# Patient Record
Sex: MALE | Race: WHITE | NOT HISPANIC OR LATINO | URBAN - METROPOLITAN AREA
[De-identification: names, ages, dates, MRNs, and addresses within clinical notes are randomized per-mention and may not be internally consistent; named-entity substitution may affect disease eponyms.]

---

## 2022-01-01 ENCOUNTER — TELEPHONE (OUTPATIENT)
Dept: TRANSPLANT | Facility: CLINIC | Age: 57
End: 2022-01-01
Payer: COMMERCIAL

## 2022-01-01 ENCOUNTER — TELEPHONE (OUTPATIENT)
Dept: TRANSPLANT | Facility: CLINIC | Age: 57
End: 2022-01-01

## 2022-01-01 ENCOUNTER — LAB (OUTPATIENT)
Dept: LAB | Facility: CLINIC | Age: 57
End: 2022-01-01
Attending: INTERNAL MEDICINE
Payer: COMMERCIAL

## 2022-01-01 ENCOUNTER — DOCUMENTATION ONLY (OUTPATIENT)
Dept: TRANSPLANT | Facility: CLINIC | Age: 57
End: 2022-01-01
Payer: COMMERCIAL

## 2022-01-01 ENCOUNTER — PRE VISIT (OUTPATIENT)
Dept: GASTROENTEROLOGY | Facility: CLINIC | Age: 57
End: 2022-01-01
Payer: COMMERCIAL

## 2022-01-01 ENCOUNTER — HOSPITAL ENCOUNTER (OUTPATIENT)
Dept: BEHAVIORAL HEALTH | Facility: CLINIC | Age: 57
Discharge: HOME | End: 2022-03-15
Attending: INTERNAL MEDICINE
Payer: COMMERCIAL

## 2022-01-01 ENCOUNTER — TRANSCRIBE ORDERS (OUTPATIENT)
Dept: OTHER | Age: 57
End: 2022-01-01
Payer: COMMERCIAL

## 2022-01-01 ENCOUNTER — TELEPHONE (OUTPATIENT)
Dept: GASTROENTEROLOGY | Facility: CLINIC | Age: 57
End: 2022-01-01

## 2022-01-01 ENCOUNTER — ALLIED HEALTH/NURSE VISIT (OUTPATIENT)
Dept: TRANSPLANT | Facility: CLINIC | Age: 57
End: 2022-01-01
Attending: INTERNAL MEDICINE
Payer: COMMERCIAL

## 2022-01-01 ENCOUNTER — DOCUMENTATION ONLY (OUTPATIENT)
Dept: OTHER | Facility: CLINIC | Age: 57
End: 2022-01-01
Payer: COMMERCIAL

## 2022-01-01 ENCOUNTER — REFERRAL (OUTPATIENT)
Dept: TRANSPLANT | Facility: CLINIC | Age: 57
End: 2022-01-01
Payer: COMMERCIAL

## 2022-01-01 ENCOUNTER — COMMITTEE REVIEW (OUTPATIENT)
Dept: TRANSPLANT | Facility: CLINIC | Age: 57
End: 2022-01-01
Payer: COMMERCIAL

## 2022-01-01 ENCOUNTER — TRANSFERRED RECORDS (OUTPATIENT)
Dept: HEALTH INFORMATION MANAGEMENT | Facility: CLINIC | Age: 57
End: 2022-01-01
Payer: COMMERCIAL

## 2022-01-01 ENCOUNTER — MEDICAL CORRESPONDENCE (OUTPATIENT)
Dept: HEALTH INFORMATION MANAGEMENT | Facility: CLINIC | Age: 57
End: 2022-01-01
Payer: COMMERCIAL

## 2022-01-01 ENCOUNTER — ANCILLARY PROCEDURE (OUTPATIENT)
Dept: ULTRASOUND IMAGING | Facility: CLINIC | Age: 57
End: 2022-01-01
Attending: INTERNAL MEDICINE
Payer: COMMERCIAL

## 2022-01-01 ENCOUNTER — OFFICE VISIT (OUTPATIENT)
Dept: GASTROENTEROLOGY | Facility: CLINIC | Age: 57
End: 2022-01-01
Payer: COMMERCIAL

## 2022-01-01 ENCOUNTER — ANCILLARY PROCEDURE (OUTPATIENT)
Dept: GENERAL RADIOLOGY | Facility: CLINIC | Age: 57
End: 2022-01-01
Attending: INTERNAL MEDICINE
Payer: COMMERCIAL

## 2022-01-01 VITALS
BODY MASS INDEX: 26.92 KG/M2 | DIASTOLIC BLOOD PRESSURE: 58 MMHG | HEIGHT: 68 IN | WEIGHT: 177.6 LBS | SYSTOLIC BLOOD PRESSURE: 92 MMHG | OXYGEN SATURATION: 100 % | HEART RATE: 63 BPM

## 2022-01-01 VITALS
DIASTOLIC BLOOD PRESSURE: 67 MMHG | WEIGHT: 199.5 LBS | OXYGEN SATURATION: 100 % | BODY MASS INDEX: 30.33 KG/M2 | HEART RATE: 93 BPM | SYSTOLIC BLOOD PRESSURE: 103 MMHG

## 2022-01-01 VITALS — HEIGHT: 68 IN | BODY MASS INDEX: 26.98 KG/M2 | WEIGHT: 178 LBS

## 2022-01-01 DIAGNOSIS — K70.31 ALCOHOLIC CIRRHOSIS OF LIVER WITH ASCITES (H): ICD-10-CM

## 2022-01-01 DIAGNOSIS — K70.30 ALCOHOLIC CIRRHOSIS (H): ICD-10-CM

## 2022-01-01 DIAGNOSIS — K76.6 PORTAL HYPERTENSION (H): ICD-10-CM

## 2022-01-01 DIAGNOSIS — G93.40 ENCEPHALOPATHY: ICD-10-CM

## 2022-01-01 DIAGNOSIS — K76.9 LIVER LESION: ICD-10-CM

## 2022-01-01 DIAGNOSIS — D64.9 ANEMIA: ICD-10-CM

## 2022-01-01 DIAGNOSIS — K70.30 ALCOHOLIC CIRRHOSIS (H): Primary | ICD-10-CM

## 2022-01-01 DIAGNOSIS — K70.31 ALCOHOLIC CIRRHOSIS OF LIVER WITH ASCITES (H): Primary | ICD-10-CM

## 2022-01-01 DIAGNOSIS — D64.9 ANEMIA, UNSPECIFIED TYPE: ICD-10-CM

## 2022-01-01 DIAGNOSIS — F41.9 ANXIETY: Primary | ICD-10-CM

## 2022-01-01 DIAGNOSIS — E44.0 MODERATE PROTEIN-CALORIE MALNUTRITION (H): ICD-10-CM

## 2022-01-01 DIAGNOSIS — I85.11 SECONDARY ESOPHAGEAL VARICES WITH BLEEDING (H): ICD-10-CM

## 2022-01-01 DIAGNOSIS — K76.82 HEPATIC ENCEPHALOPATHY (H): ICD-10-CM

## 2022-01-01 LAB
A1 AB TITR SERPL: >256 {TITER}
A1 AB TITR SERPL: >256 {TITER}
ABO/RH(D): NORMAL
ABO/RH(D): NORMAL
AFP SERPL-MCNC: 2.1 UG/L (ref 0–8)
AFP SERPL-MCNC: <1.5 UG/L (ref 0–8)
ALBUMIN SERPL-MCNC: 2.2 G/DL (ref 3.4–5)
ALBUMIN SERPL-MCNC: 3 G/DL (ref 3.4–5)
ALP SERPL-CCNC: 148 U/L (ref 40–150)
ALP SERPL-CCNC: 205 U/L (ref 40–150)
ALT SERPL W P-5'-P-CCNC: 24 U/L (ref 0–70)
ALT SERPL W P-5'-P-CCNC: 26 U/L (ref 0–70)
ANION GAP SERPL CALCULATED.3IONS-SCNC: 10 MMOL/L (ref 3–14)
ANION GAP SERPL CALCULATED.3IONS-SCNC: 12 MMOL/L (ref 3–14)
ANTIBODY SCREEN: NEGATIVE
AST SERPL W P-5'-P-CCNC: 26 U/L (ref 0–45)
AST SERPL W P-5'-P-CCNC: 38 U/L (ref 0–45)
B IGG TITR SERPL: >256 {TITER}
BILIRUB DIRECT SERPL-MCNC: 1.2 MG/DL (ref 0–0.2)
BILIRUB DIRECT SERPL-MCNC: 1.3 MG/DL (ref 0–0.2)
BILIRUB SERPL-MCNC: 2 MG/DL (ref 0.2–1.3)
BILIRUB SERPL-MCNC: 3.2 MG/DL (ref 0.2–1.3)
BUN SERPL-MCNC: 37 MG/DL (ref 7–30)
BUN SERPL-MCNC: 38 MG/DL (ref 7–30)
CALCIUM SERPL-MCNC: 8 MG/DL (ref 8.5–10.1)
CALCIUM SERPL-MCNC: 8.3 MG/DL (ref 8.5–10.1)
CHLORIDE BLD-SCNC: 104 MMOL/L (ref 94–109)
CHLORIDE BLD-SCNC: 108 MMOL/L (ref 94–109)
CMV IGG SERPL IA-ACNC: <0.2 U/ML
CMV IGG SERPL IA-ACNC: NORMAL
CO2 SERPL-SCNC: 18 MMOL/L (ref 20–32)
CO2 SERPL-SCNC: 21 MMOL/L (ref 20–32)
CREAT SERPL-MCNC: 1.52 MG/DL (ref 0.66–1.25)
CREAT SERPL-MCNC: 1.81 MG/DL (ref 0.66–1.25)
DEPRECATED CALCIDIOL+CALCIFEROL SERPL-MC: 21 UG/L (ref 20–75)
DLCOUNC-%PRED-PRE: 85 %
DLCOUNC-PRE: 22.53 ML/MIN/MMHG
DLCOUNC-PRED: 26.29 ML/MIN/MMHG
EBV VCA IGG SER IA-ACNC: >750 U/ML
EBV VCA IGG SER IA-ACNC: POSITIVE
ERV-%PRED-PRE: 43 %
ERV-PRE: 0.41 L
ERV-PRED: 0.94 L
ERYTHROCYTE [DISTWIDTH] IN BLOOD BY AUTOMATED COUNT: 18.2 % (ref 10–15)
ERYTHROCYTE [DISTWIDTH] IN BLOOD BY AUTOMATED COUNT: 18.6 % (ref 10–15)
EXPTIME-PRE: 6.9 SEC
FEF2575-%PRED-PRE: 104 %
FEF2575-PRE: 3.09 L/SEC
FEF2575-PRED: 2.96 L/SEC
FEFMAX-%PRED-PRE: 76 %
FEFMAX-PRE: 6.85 L/SEC
FEFMAX-PRED: 8.92 L/SEC
FERRITIN SERPL-MCNC: 53 NG/ML (ref 26–388)
FEV1-%PRED-PRE: 94 %
FEV1-PRE: 3.25 L
FEV1FEV6-PRE: 81 %
FEV1FEV6-PRED: 79 %
FEV1FVC-PRE: 81 %
FEV1FVC-PRED: 78 %
FEV1SVC-PRE: 96 %
FEV1SVC-PRED: 73 %
FIFMAX-PRE: 4.17 L/SEC
FVC-%PRED-PRE: 91 %
FVC-PRE: 4.04 L
FVC-PRED: 4.4 L
GAMMA INTERFERON BACKGROUND BLD IA-ACNC: 0.05 IU/ML
GFR SERPL CREATININE-BSD FRML MDRD: 43 ML/MIN/1.73M2
GFR SERPL CREATININE-BSD FRML MDRD: 53 ML/MIN/1.73M2
GLUCOSE BLD-MCNC: 138 MG/DL (ref 70–99)
GLUCOSE BLD-MCNC: 144 MG/DL (ref 70–99)
HAV IGG SER QL IA: REACTIVE
HBV CORE AB SERPL QL IA: NONREACTIVE
HBV SURFACE AB SERPL IA-ACNC: 70.99 M[IU]/ML
HCT VFR BLD AUTO: 23 % (ref 40–53)
HCT VFR BLD AUTO: 25.6 % (ref 40–53)
HGB BLD-MCNC: 6.9 G/DL (ref 13.3–17.7)
HGB BLD-MCNC: 7.8 G/DL (ref 13.3–17.7)
HIV 1+2 AB+HIV1 P24 AG SERPL QL IA: NONREACTIVE
IC-%PRED-PRE: 79 %
IC-PRE: 2.98 L
IC-PRED: 3.75 L
INR PPP: 1.58 (ref 0.85–1.15)
INR PPP: 2.24 (ref 0.85–1.15)
IRON SATN MFR SERPL: 30 % (ref 15–46)
IRON SERPL-MCNC: 69 UG/DL (ref 35–180)
M TB IFN-G BLD-IMP: NEGATIVE
M TB IFN-G CD4+ BCKGRND COR BLD-ACNC: 4.61 IU/ML
MCH RBC QN AUTO: 27.4 PG (ref 26.5–33)
MCH RBC QN AUTO: 27.9 PG (ref 26.5–33)
MCHC RBC AUTO-ENTMCNC: 30 G/DL (ref 31.5–36.5)
MCHC RBC AUTO-ENTMCNC: 30.5 G/DL (ref 31.5–36.5)
MCV RBC AUTO: 91 FL (ref 78–100)
MCV RBC AUTO: 91 FL (ref 78–100)
MITOGEN IGNF BCKGRD COR BLD-ACNC: 0.01 IU/ML
MITOGEN IGNF BCKGRD COR BLD-ACNC: 0.01 IU/ML
PHOSPHATE SERPL-MCNC: 3 MG/DL (ref 2.5–4.5)
PLATELET # BLD AUTO: 107 10E3/UL (ref 150–450)
PLATELET # BLD AUTO: 140 10E3/UL (ref 150–450)
PLPETH BLD-MCNC: <10 NG/ML
POPETH BLD-MCNC: <10 NG/ML
POTASSIUM BLD-SCNC: 3.4 MMOL/L (ref 3.4–5.3)
POTASSIUM BLD-SCNC: 4.5 MMOL/L (ref 3.4–5.3)
PROT SERPL-MCNC: 5.8 G/DL (ref 6.8–8.8)
PROT SERPL-MCNC: 5.9 G/DL (ref 6.8–8.8)
PSA SERPL-MCNC: 0.88 UG/L (ref 0–4)
QUANTIFERON MITOGEN: 4.66 IU/ML
QUANTIFERON NIL TUBE: 0.05 IU/ML
QUANTIFERON TB1 TUBE: 0.06 IU/ML
QUANTIFERON TB2 TUBE: 0.06
RBC # BLD AUTO: 2.52 10E6/UL (ref 4.4–5.9)
RBC # BLD AUTO: 2.8 10E6/UL (ref 4.4–5.9)
SODIUM SERPL-SCNC: 136 MMOL/L (ref 133–144)
SODIUM SERPL-SCNC: 137 MMOL/L (ref 133–144)
SPECIMEN EXPIRATION DATE: NORMAL
T PALLIDUM AB SER QL: NONREACTIVE
T4 FREE SERPL-MCNC: 1.06 NG/DL (ref 0.76–1.46)
TIBC SERPL-MCNC: 232 UG/DL (ref 240–430)
TSH SERPL DL<=0.005 MIU/L-ACNC: 4.86 MU/L (ref 0.4–4)
VA-%PRED-PRE: 104 %
VA-PRE: 6.39 L
VC-%PRED-PRE: 72 %
VC-PRE: 3.38 L
VC-PRED: 4.69 L
WBC # BLD AUTO: 11.4 10E3/UL (ref 4–11)
WBC # BLD AUTO: 8.3 10E3/UL (ref 4–11)

## 2022-01-01 PROCEDURE — 80053 COMPREHEN METABOLIC PANEL: CPT | Performed by: PATHOLOGY

## 2022-01-01 PROCEDURE — 85027 COMPLETE CBC AUTOMATED: CPT | Performed by: PATHOLOGY

## 2022-01-01 PROCEDURE — 86901 BLOOD TYPING SEROLOGIC RH(D): CPT | Performed by: INTERNAL MEDICINE

## 2022-01-01 PROCEDURE — G0463 HOSPITAL OUTPT CLINIC VISIT: HCPCS

## 2022-01-01 PROCEDURE — 82248 BILIRUBIN DIRECT: CPT | Performed by: PATHOLOGY

## 2022-01-01 PROCEDURE — 84100 ASSAY OF PHOSPHORUS: CPT | Performed by: PATHOLOGY

## 2022-01-01 PROCEDURE — 82105 ALPHA-FETOPROTEIN SERUM: CPT | Performed by: INTERNAL MEDICINE

## 2022-01-01 PROCEDURE — 86780 TREPONEMA PALLIDUM: CPT | Performed by: INTERNAL MEDICINE

## 2022-01-01 PROCEDURE — 86704 HEP B CORE ANTIBODY TOTAL: CPT | Performed by: INTERNAL MEDICINE

## 2022-01-01 PROCEDURE — 84439 ASSAY OF FREE THYROXINE: CPT | Performed by: PATHOLOGY

## 2022-01-01 PROCEDURE — 84443 ASSAY THYROID STIM HORMONE: CPT | Performed by: PATHOLOGY

## 2022-01-01 PROCEDURE — 86850 RBC ANTIBODY SCREEN: CPT | Performed by: INTERNAL MEDICINE

## 2022-01-01 PROCEDURE — 85610 PROTHROMBIN TIME: CPT | Performed by: PATHOLOGY

## 2022-01-01 PROCEDURE — 99207 PHOSPHATIDYLETHANOL (PETH), WHOLE BLOOD: CPT | Performed by: PATHOLOGY

## 2022-01-01 PROCEDURE — 99205 OFFICE O/P NEW HI 60 MIN: CPT | Performed by: TRANSPLANT SURGERY

## 2022-01-01 PROCEDURE — 99205 OFFICE O/P NEW HI 60 MIN: CPT | Performed by: INTERNAL MEDICINE

## 2022-01-01 PROCEDURE — 86665 EPSTEIN-BARR CAPSID VCA: CPT | Performed by: INTERNAL MEDICINE

## 2022-01-01 PROCEDURE — 86706 HEP B SURFACE ANTIBODY: CPT | Performed by: INTERNAL MEDICINE

## 2022-01-01 PROCEDURE — 71046 X-RAY EXAM CHEST 2 VIEWS: CPT | Mod: GC | Performed by: RADIOLOGY

## 2022-01-01 PROCEDURE — 94375 RESPIRATORY FLOW VOLUME LOOP: CPT | Performed by: INTERNAL MEDICINE

## 2022-01-01 PROCEDURE — 93975 VASCULAR STUDY: CPT | Mod: GC | Performed by: STUDENT IN AN ORGANIZED HEALTH CARE EDUCATION/TRAINING PROGRAM

## 2022-01-01 PROCEDURE — 82728 ASSAY OF FERRITIN: CPT | Performed by: PATHOLOGY

## 2022-01-01 PROCEDURE — 86886 COOMBS TEST INDIRECT TITER: CPT | Performed by: INTERNAL MEDICINE

## 2022-01-01 PROCEDURE — 99000 SPECIMEN HANDLING OFFICE-LAB: CPT | Performed by: PATHOLOGY

## 2022-01-01 PROCEDURE — 94729 DIFFUSING CAPACITY: CPT | Performed by: INTERNAL MEDICINE

## 2022-01-01 PROCEDURE — G0103 PSA SCREENING: HCPCS | Performed by: PATHOLOGY

## 2022-01-01 PROCEDURE — 86644 CMV ANTIBODY: CPT | Performed by: INTERNAL MEDICINE

## 2022-01-01 PROCEDURE — 83550 IRON BINDING TEST: CPT | Performed by: PATHOLOGY

## 2022-01-01 PROCEDURE — 36415 COLL VENOUS BLD VENIPUNCTURE: CPT | Performed by: PATHOLOGY

## 2022-01-01 PROCEDURE — 82306 VITAMIN D 25 HYDROXY: CPT | Performed by: INTERNAL MEDICINE

## 2022-01-01 PROCEDURE — 86708 HEPATITIS A ANTIBODY: CPT | Performed by: INTERNAL MEDICINE

## 2022-01-01 PROCEDURE — 86481 TB AG RESPONSE T-CELL SUSP: CPT | Performed by: INTERNAL MEDICINE

## 2022-01-01 RX ORDER — FUROSEMIDE 20 MG
TABLET ORAL
COMMUNITY
Start: 2021-01-01

## 2022-01-01 RX ORDER — NYSTATIN 100000 [USP'U]/G
POWDER TOPICAL
COMMUNITY
Start: 2021-01-01

## 2022-01-01 RX ORDER — SUCRALFATE 1 G/1
TABLET ORAL
COMMUNITY
Start: 2021-01-01

## 2022-01-01 RX ORDER — FERROUS SULFATE 325(65) MG
325 TABLET ORAL
COMMUNITY
Start: 2021-01-01

## 2022-01-01 RX ORDER — LACTULOSE 10 G/15ML
20 SOLUTION ORAL
COMMUNITY
Start: 2021-01-01

## 2022-01-01 RX ORDER — PROCHLORPERAZINE MALEATE 5 MG
5 TABLET ORAL
COMMUNITY

## 2022-01-01 RX ORDER — LANOLIN ALCOHOL/MO/W.PET/CERES
100 CREAM (GRAM) TOPICAL
COMMUNITY

## 2022-01-01 RX ORDER — DIAZEPAM 5 MG
5 TABLET ORAL ONCE
Status: DISCONTINUED | OUTPATIENT
Start: 2022-01-01 | End: 2022-01-01 | Stop reason: CLARIF

## 2022-01-01 RX ORDER — DIAZEPAM 5 MG
5 TABLET ORAL EVERY 6 HOURS PRN
Qty: 1 TABLET | Refills: 0 | Status: SHIPPED | OUTPATIENT
Start: 2022-01-01

## 2022-01-01 RX ORDER — PANTOPRAZOLE SODIUM 40 MG/1
40 TABLET, DELAYED RELEASE ORAL
COMMUNITY
Start: 2021-01-01

## 2022-01-01 ASSESSMENT — PAIN SCALES - GENERAL: PAINLEVEL: NO PAIN (0)

## 2022-01-01 ASSESSMENT — MIFFLIN-ST. JEOR: SCORE: 1605.09

## 2022-01-31 NOTE — TELEPHONE ENCOUNTER
RECORDS RECEIVED FROM: external  Diagnoses: alcoholic cirrhosis     Appt Date: 2.8.22   NOTES STATUS DETAILS   OFFICE NOTE from referring provider Received 1.17.22 Dr. Miki Washington, [facility]   OFFICE NOTES from other specialists N/A    DISCHARGE SUMMARY from hospital Received Discharged 1.20.22 [facility]   MEDICATION LIST N/A    LIVER BIOSPY (IF APPLICABLE)      PATHOLOGY REPORTS  N/A    IMAGING     ENDOSCOPY (IF AVAILABLE) N/A    COLONOSCOPY (IF AVAILABLE) N/A    ULTRASOUND LIVER N/A    CT OF ABDOMEN N/A    MRI OF LIVER N/A    FIBROSCAN, US ELASTOGRAPHY, FIBROSIS SCAN, MR ELASTOGRAPHY N/A    LABS     HEPATIC PANEL (LIVER PANEL) N/A    BASIC METABOLIC PANEL N/A    COMPLETE METABOLIC PANEL N/A    COMPLETE BLOOD COUNT (CBC) Care Everywhere 1.20.22   INTERNATIONAL NORMALIZED RATIO (INR) N/A    HEPATITIS C ANTIBODY N/A    HEPATITIS C VIRAL LOAD/PCR N/A    HEPATITIS C GENOTYPE N/A    HEPATITIS B SURFACE ANTIGEN N/A    HEPATITIS B SURFACE ANTIBODY N/A    HEPATITIS B DNA QUANT LEVEL N/A    HEPATITIS B CORE ANTIBODY N/A      Action 1.31.22 MJ   Action Taken Sent request to [facility] for any other notes related.

## 2022-02-08 PROBLEM — K70.31 ALCOHOLIC CIRRHOSIS OF LIVER WITH ASCITES (H): Status: ACTIVE | Noted: 2022-01-01

## 2022-02-08 PROBLEM — I85.11 SECONDARY ESOPHAGEAL VARICES WITH BLEEDING (H): Status: ACTIVE | Noted: 2022-01-01

## 2022-02-08 PROBLEM — E44.0 MODERATE PROTEIN-CALORIE MALNUTRITION (H): Status: ACTIVE | Noted: 2022-01-01

## 2022-02-08 PROBLEM — K76.82 HEPATIC ENCEPHALOPATHY (H): Status: ACTIVE | Noted: 2022-01-01

## 2022-02-08 NOTE — PROGRESS NOTES
Date of Service: 2/8/2022     Referring Provider: Miki Washington    Subjective:            Trey Oneill is a 56 year old male presenting for evaluation of liver disease    History of Present Illness   Trey Oneill is a 56 year old male with past medical history of severe alcohol use disorder in sustained remission complicated with the development of alcohol-related cirrhosis complicated by bleeding esophageal varices, anemia, ascites who presents to [facility] care.    Notes he was diagnosed with cirrhosis back in 2018 when he presented to the hospital with an acute upper GI bleed and hematemesis.  Endoscopy at that time demonstrated large varices and he underwent band ligation. At that time he was drinking approximately 12 beers per day, and had done so for many years prior.  He embraced sobriety and has not had any alcohol since.  Decompensation with the recent onset of encephalopathy for which he has been prescribed lactulose ongoing varices noted on endoscopy that have been slow to respond to band ligation, and ascites which has been somewhat refractory to diuretics (main side effect of dizziness and hypotension) and is requiring a paracentesis every 1 to 2 weeks.     He had previously been on beta-blocker, but this was discontinued during his recent hospitalization for hypotension.    He underwent drainage of a hydrocele in late January 2022    Denies any overt medical history prior to the onset of his cirrhosis.  Denies significant surgical history.  He was a smoker but quit for 5 years ago.  Denies a history of IV or inhaled drugs.  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    Past Medical History:  Past Medical History:   Diagnosis Date     Alcohol use disorder, severe, in sustained remission (H)      Alcoholic cirrhosis of liver with ascites (H)      Hepatic encephalopathy (H)      Protein-calorie malnutrition, moderate (H)   "      Surgical History:  History reviewed. No pertinent surgical history.    Social History:  Social History     Tobacco Use     Smoking status: Former Smoker     Types: Cigarettes     Smokeless tobacco: Never Used   Substance Use Topics     Alcohol use: Not Currently     Comment: 5/18/2018; ~12 beers per day     Drug use: Never       Family History:  Family History   Problem Relation Age of Onset     Substance Abuse Father      Substance Abuse Cousin      Liver Disease Cousin        Medications:  Current Outpatient Medications   Medication     ferrous sulfate (FEROSUL) 325 (65 Fe) MG tablet     lactulose (CHRONULAC) 10 GM/15ML solution     NYSTOP 043464 UNIT/GM powder     pantoprazole (PROTONIX) 40 MG EC tablet     prochlorperazine (COMPAZINE) 5 MG tablet     sucralfate (CARAFATE) 1 GM tablet     thiamine (B-1) 100 MG tablet     No current facility-administered medications for this visit.       Review of Systems  A complete 10 point review of systems was asked and answered in the negative unless specifically commented upon in the HPI    Objective:         Vitals:    02/08/22 0807   BP: 92/58   Pulse: 63   SpO2: 100%   Weight: 80.6 kg (177 lb 9.6 oz)   Height: 1.727 m (5' 8\")     Body mass index is 27 kg/m .     Physical Exam  Constitutional: cachectic, mild apparent distress.    HEENT: Normocephalic. mild scleral icterus.  Neck/Lymph: Normal ROM  Cardiac:  Regular rate  Respiratory: Normal respiratory excursion   GI:  Abdomen soft, distended, non-tender. BS present. + shifting dullness.   Skin:  Skin is warm and dry. No rash noted.  + jaundice. + spider nevi noted.  + palmar erythema  Peripheral Vascular: trace lower extremity edema. 2+ pulses in all extremities  Musculoskeletal:  ROM intact, decreased muscle bulk    Psychiatric: Normal mood and affect. Behavior is normal.  Neuro:  no asterixis, + tremor    Labs and Diagnostic tests:  Lab Results   Component Value Date     02/08/2022    POTASSIUM 4.5 " 02/08/2022    CHLORIDE 108 02/08/2022    CO2 18 02/08/2022    BUN 38 02/08/2022    CR 1.81 02/08/2022     Lab Results   Component Value Date    BILITOTAL 3.2 02/08/2022    ALT 24 02/08/2022    AST 26 02/08/2022    ALKPHOS 148 02/08/2022     Lab Results   Component Value Date    ALBUMIN 3.0 02/08/2022    PROTTOTAL 5.9 02/08/2022      Lab Results   Component Value Date    WBC 11.4 02/08/2022    HGB 7.8 02/08/2022    MCV 91 02/08/2022     02/08/2022     Lab Results   Component Value Date    INR 2.24 02/08/2022     MELD-Na score: 25 at 2/8/2022  9:19 AM  MELD score: 25 at 2/8/2022  9:19 AM  Calculated from:  Serum Creatinine: 1.81 mg/dL at 2/8/2022  9:19 AM  Serum Sodium: 136 mmol/L at 2/8/2022  9:19 AM  Total Bilirubin: 3.2 mg/dL at 2/8/2022  9:19 AM  INR(ratio): 2.24 at 2/8/2022  9:18 AM  Age: 56 years    Imaging:  MRI 8/13/2021  FINDINGS:   Respiratory motion and patient motion significantly limits image quality.     Exophytic masslike projection arises from the right hepatic lobe inferiorly,   seen for example on image 32 of series 15, measuring about 2.2 cm.  This   structure follows hepatic signal on all sequences.  No definite hyper   enhancement or washout.  This is likely a focally regenerating nodule in the   setting of profound cirrhosis.  The appearance is not typical for a pedis   cellular carcinoma.  Please see further discussion below.     Cirrhotic hepatic morphology with microlobulated contours.  No hyperenhancing   lesions are identified.  Gallbladder wall thickening.  No definite gallstones.   Splenomegaly at 17 cm.  Esophageal varices, and splenic varices are again   noted.  Recanalization of the umbilical vein.  Pancreas, and adrenal glands   appear grossly unremarkable.  Kidneys enhance symmetrically.  No evidence for   obstructive uropathy.  Abdominal aorta is normal in size.  Loops of bowel   appear grossly unremarkable.  Large amount of ascites.  Degenerative changes   are seen throughout  the lower thoracic and lumbar spine.     ECHO 22  CONCLUSION:   1.  Mildly dilated left ventricle with normal systolic function and no wall motion abnormalities.  Ejection fraction 65%.       2.  Normal left ventricular wall thickness with indeterminate diastolic function.     3.  Left atrial enlargement.       4.  Mild aortic stenosis.     5.  Mild mitral stenosis and mild mitral regurgitation.     6.  Incidentally noted pleural effusion and ascites, suggest alternative form of imaging for further evaluation.         Procedures:    EGD: 10/21  - Small varices  - portal hypertensive gastropathy    Colonoscopy: 2018  5mm polyp, 15mm polyp - TA/TVA    Colonoscopy : poor prep      Assessment and Plan:    Cirrhosis:    -Secondary to longstanding history of alcohol use disorder  -Sober for approximately 4 years  Has decompensated disease as evidenced by bleeding esophageal varices, ascites, hepatic encephalopathy  -Current MELD based on today's labs of 25  -Based on the above we will refer for formal transplant evaluation  -He is encouraged on his ongoing sobriety    Severe alcohol use disorder; in sustained remission  -Reports no alcohol since 2018  -Encouraged on sobriety to date    Liver Transplant Candidacy:   - We discussed need for transplantation, organ availability and prioritization process for liver transplantation in the [place].  We discussed the guiding principals of justice and equality that dictate transplant candidacy  - We discussed the MELD score, the variables that are followed, and how the MELD score impacts transplantation  - We discussed the evaluation process for candidacy  - We discussed donor types - including  donors (DBD, DCD) and living donors  -We will refer the patient for formal evaluation as to liver transplant candidacy given his elevated M ELD in the setting of decompensated cirrhosis    Hepatocellular Cancer Screening:   -Given his cirrhosis he warrants being  enrolled in a screening program  -Noted there was a 2.2 cm lesion in the right inferior hepatic lobe on his last MRI from August 2021 that should be followed up; we will plan to repeat imaging locally  - Recommend screening for HCC every 6 months with either abdominal ultrasound or by alternating abdominal ultrasound with EITHER a triple/quad phase CT Liver with IV contrast OR a Quad phase MRI Liver with IV contrast.  AFP levels should be checked every 6 months at time of imaging screen.    Ascites:  -He does have issues with ascites.  She has been elevating changing doses of diuretics given his hypotension  -Discussed at length the need for low-sodium diet indefinitely  -We will await his labs to make further assessments as to ability to change his diuretic regimen  - Continue to follow a sodium restricted (<2g sodium diet)     Hepatic Encephalopathy:  -He has developed hepatic encephalopathy and recommend he continues on the lactulose with goal 2-3 bowel movements daily    Esophageal Varices:   -His initial decompensating event was bleeding varices in May 2018.  He has been followed with serial EGDs, with intermittent banding.  Last EGD in October 2021 with small esophageal varices  - Recommend repeating an upper GI endoscopy October 2022 or sooner if clinical decompensation.  If evidence of medium/large esophageal varices, would recommend esophageal varix band ligation     Kidney Health:   -Has intermittent and ongoing RENALDO; will continue to avoid diuretics at this time    Immobility/Frailty:   -He is agreeable based on his moderate to severe protein calorie malnutrition in the setting of his chronic liver disease  -Discussed the need for him to remain physically active as a means of preventing frailty    Nutrition:  As with most patients with chronic liver disease, there is a significant degree of protein malnutrition.  Dicussed need to change dietary habits to improve overall protein balance.  Discussed the  importance of eating between 1.2-1.5g/kg/day lean protein like eggs, fish, chicken, nuts, and legumes, in addition to a diet rich in fresh fruits and vegetables.  Continue to follow a sodium restricted (<2g sodium diet) and discussed the need to minimize the intake of carbohydrates and sugars, to avoid obesity.   - Strongly encourage protein supplements 2-3 times daily (Boost, Ensure, [employer] Instant Milk, etc.) to meet protein and caloric intake.  - Recommend a bedtime snack with protein and complex carbohydrate to minimize risk of muscle catabolism overnight    Routine Health Care in Patient with Chronic Liver Disease:  - We recommend screening for hepatitis A and B, please vaccinate if not immune  - All patients with liver disease, particularly those with cholestatic liver disease, are at an increased risk for osteoporosis.  We strongly recommend screening for Vitamin D deficiency at least twice yearly with aggressive supplementation/replacement as indicated.    - We also recommend a screening DEXA scan to evaluate for osteoporosis.  If present, should treat with calcium, Vitamin D supplementation, and recommend consideration of bisphosphonate therapy.  Also recommend follow up DEXA scans to evaluate for improvement of bone density on therapy.  - All patients with liver disease should avoid the use of Non-steroidal Anti-Inflammatory (NSAID) medications as they can cause significant injury to the kidneys in this population    Follow Up:  3 months     Thank you very much for the opportunity to participate in the care of this patient.  If you have any further questions, please don't hesitate to contact our office.    Thomas M. Leventhal, M.D.   of Medicine  Advanced & Transplant Hepatology  [facility]

## 2022-02-08 NOTE — LETTER
2/8/2022         RE: Trey Oneill  [address]  [facility] [address]        Dear Colleague,    Thank you for referring your patient, Trey Oneill, to the [facility] [place]. Please see a copy of my visit note below.    Date of Service: 2/8/2022     Referring Provider: Miki Washington    Subjective:            Trey Oneill is a 56 year old male presenting for evaluation of liver disease    History of Present Illness   Trey Oneill is a 56 year old male with past medical history of severe alcohol use disorder in sustained remission complicated with the development of alcohol-related cirrhosis complicated by bleeding esophageal varices, anemia, ascites who presents to [facility] care.    Notes he was diagnosed with cirrhosis back in 2018 when he presented to the hospital with an acute upper GI bleed and hematemesis.  Endoscopy at that time demonstrated large varices and he underwent band ligation at that time he was drinking approximately 12 beers per day, and had done so for many years prior.  He embrace sobriety and has not had any alcohol since.  Compensation with the recent onset of encephalopathy for which he has been prescribed lactulose ongoing varices noted on endoscopy that have been slow to respond to band ligation, and ascites which has been somewhat refractory to diuretics (main side effect of dizziness and hypotension) and is requiring a paracentesis every 1 to 2 weeks.    Transplant he had previously been on beta-blocker, but this was discontinued during her recent hospitalization for hypotension.    He underwent drainage of a hydrocele in late January 2022    Denies any overt medical history prior to the onset of his cirrhosis.  Denies significant surgical history.  He was a smoker but quit for 5 years ago.  Denies a history of IV or inhaled drugs.  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    Past Medical History:  Past Medical History:   Diagnosis Date     Alcohol use disorder, severe, in sustained remission (H)      Alcoholic cirrhosis of liver with ascites (H)      Hepatic encephalopathy (H)      Protein-calorie malnutrition, moderate (H)        Surgical History:  History reviewed. No pertinent surgical history.    Social History:  Social History     Tobacco Use     Smoking status: Former Smoker     Types: Cigarettes     Smokeless tobacco: Never Used   Substance Use Topics     Alcohol use: Not Currently     Comment: 5/18/2018; ~12 beers per day     Drug use: Never       Family History:  Family History   Problem Relation Age of Onset     Substance Abuse Father      Substance Abuse Cousin      Liver Disease Cousin        Medications:  Current Outpatient Medications   Medication     ferrous sulfate (FEROSUL) 325 (65 Fe) MG tablet     lactulose (CHRONULAC) 10 GM/15ML solution     NYSTOP [id number] UNIT/GM powder     pantoprazole (PROTONIX) 40 MG EC tablet     prochlorperazine (COMPAZINE) 5 MG tablet     sucralfate (CARAFATE) 1 GM tablet     thiamine (B-1) 100 MG tablet     No current facility-administered medications for this visit.       Review of Systems  A complete 10 point review of systems was asked and answered in the negative unless specifically commented upon in the HPI    Objective:         Vitals:    02/08/22 0807   BP: 92/58   Pulse: 63   SpO2: 100%   Weight: 80.6 kg (177 lb 9.6 oz)   Height: 1.727 m (5' 8\")     Body mass index is 27 kg/m .     Physical Exam  Constitutional: cachectic, mild apparent distress.    HEENT: Normocephalic. mild scleral icterus.  Neck/Lymph: Normal ROM  Cardiac:  Regular rate  Respiratory: Normal respiratory excursion   GI:  Abdomen soft, distended, non-tender. BS present. + shifting dullness.   Skin:  Skin is warm and dry. No rash noted.  + jaundice. + spider nevi noted.  + palmar erythema  Peripheral Vascular: trace lower extremity edema. 2+ pulses in all " extremities  Musculoskeletal:  ROM intact, decreased muscle bulk    Psychiatric: Normal mood and affect. Behavior is normal.  Neuro:  no asterixis, + tremor    Labs and Diagnostic tests:  Lab Results   Component Value Date     02/08/2022    POTASSIUM 4.5 02/08/2022    CHLORIDE 108 02/08/2022    CO2 18 02/08/2022    BUN 38 02/08/2022    CR 1.81 02/08/2022     Lab Results   Component Value Date    BILITOTAL 3.2 02/08/2022    ALT 24 02/08/2022    AST 26 02/08/2022    ALKPHOS 148 02/08/2022     Lab Results   Component Value Date    ALBUMIN 3.0 02/08/2022    PROTTOTAL 5.9 02/08/2022      Lab Results   Component Value Date    WBC 11.4 02/08/2022    HGB 7.8 02/08/2022    MCV 91 02/08/2022     02/08/2022     Lab Results   Component Value Date    INR 2.24 02/08/2022     MELD-Na score: 25 at 2/8/2022  9:19 AM  MELD score: 25 at 2/8/2022  9:19 AM  Calculated from:  Serum Creatinine: 1.81 mg/dL at 2/8/2022  9:19 AM  Serum Sodium: 136 mmol/L at 2/8/2022  9:19 AM  Total Bilirubin: 3.2 mg/dL at 2/8/2022  9:19 AM  INR(ratio): 2.24 at 2/8/2022  9:18 AM  Age: 56 years    Imaging:  MRI 8/13/2021  FINDINGS:   Respiratory motion and patient motion significantly limits image quality.     Exophytic masslike projection arises from the right hepatic lobe inferiorly,   seen for example on image 32 of series 15, measuring about 2.2 cm.  This   structure follows hepatic signal on all sequences.  No definite hyper   enhancement or washout.  This is likely a focally regenerating nodule in the   setting of profound cirrhosis.  The appearance is not typical for a pedis   cellular carcinoma.  Please see further discussion below.     Cirrhotic hepatic morphology with microlobulated contours.  No hyperenhancing   lesions are identified.  Gallbladder wall thickening.  No definite gallstones.   Splenomegaly at 17 cm.  Esophageal varices, and splenic varices are again   noted.  Recanalization of the umbilical vein.  Pancreas, and adrenal  glands   appear grossly unremarkable.  Kidneys enhance symmetrically.  No evidence for   obstructive uropathy.  Abdominal aorta is normal in size.  Loops of bowel   appear grossly unremarkable.  Large amount of ascites.  Degenerative changes   are seen throughout the lower thoracic and lumbar spine.     ECHO 2/2/22  CONCLUSION:   1.  Mildly dilated left ventricle with normal systolic function and no wall motion abnormalities.  Ejection fraction 65%.       2.  Normal left ventricular wall thickness with indeterminate diastolic function.     3.  Left atrial enlargement.       4.  Mild aortic stenosis.     5.  Mild mitral stenosis and mild mitral regurgitation.     6.  Incidentally noted pleural effusion and ascites, suggest alternative form of imaging for further evaluation.         Procedures:    EGD: 10/21  - Small varices  - portal hypertensive gastropathy    Colonoscopy: 11/7/2018  5mm polyp, 15mm polyp - TA/TVA    Colonoscopy 2021: poor prep      Assessment and Plan:    Cirrhosis:    -Secondary to longstanding history of alcohol use disorder  -Sober for approximately 40 years  Has decompensated disease as evidenced by bleeding esophageal varices, ascites, hepatic encephalopathy  -Current M ELD based on today's labs of 25  -Based on the above we will refer for formal transplant evaluation  -He is encouraged on his ongoing sobriety    Severe alcohol use disorder; in sustained remission  -Reports no alcohol since 2018  -Encouraged on sobriety to date    Liver Transplant Candidacy:   - We discussed need for transplantation, organ availability and prioritization process for liver transplantation in the [place].  We discussed the guiding principals of justice and equality that dictate transplant candidacy  - We discussed the MELD score, the variables that are followed, and how the MELD score impacts transplantation  - We discussed the evaluation process for candidacy  - We discussed donor types - including   donors (DBD, DCD) and living donors  -We will refer the patient for formal evaluation as to liver transplant candidacy given his elevated M ELD in the setting of decompensated cirrhosis    Hepatocellular Cancer Screening:   -Given his cirrhosis he warrants being enrolled in a screening program  -Noted there was a 2.2 cm lesion in the right inferior hepatic lobe on his last MRI from 2021 that should be followed up; we will plan to repeat imaging locally  - Recommend screening for HCC every 6 months with either abdominal ultrasound or by alternating abdominal ultrasound with EITHER a triple/quad phase CT Liver with IV contrast OR a Quad phase MRI Liver with IV contrast.  AFP levels should be checked every 6 months at time of imaging screen.    Ascites:  -He does have issues with ascites.  She has been elevating changing doses of diuretics given his hypotension  -Discussed at length the need for low-sodium diet indefinitely  -We will await his labs to make further assessments as to ability to change his diuretic regimen  - Continue to follow a sodium restricted (<2g sodium diet)     Hepatic Encephalopathy:  -He has developed hepatic encephalopathy and recommend he continues on the lactulose with goal 2-3 bowel movements daily    Esophageal Varices:   -His initial decompensating event was bleeding varices in May 2018.  He has been followed with serial EGDs, with intermittent banding.  Last EGD in 2021 with small esophageal varices  - Recommend repeating an upper GI endoscopy 2022 or sooner if clinical decompensation.  If evidence of medium/large esophageal varices, would recommend esophageal varix band ligation     Kidney Health:   -Has intermittent and ongoing RENALDO; will continue to avoid diuretics at this time    Immobility/Frailty:   -He is agreeable based on his moderate to severe protein calorie malnutrition in the setting of his chronic liver disease  -Discussed the need for him to  remain physically active as a means of preventing frailty    Nutrition:  As with most patients with chronic liver disease, there is a significant degree of protein malnutrition.  Dicussed need to change dietary habits to improve overall protein balance.  Discussed the importance of eating between 1.2-1.5g/kg/day lean protein like eggs, fish, chicken, nuts, and legumes, in addition to a diet rich in fresh fruits and vegetables.  Continue to follow a sodium restricted (<2g sodium diet) and discussed the need to minimize the intake of carbohydrates and sugars, to avoid obesity.   - Strongly encourage protein supplements 2-3 times daily (Boost, Ensure, [place] Instant Milk, etc.) to meet protein and caloric intake.  - Recommend a bedtime snack with protein and complex carbohydrate to minimize risk of muscle catabolism overnight    Routine Health Care in Patient with Chronic Liver Disease:  - We recommend screening for hepatitis A and B, please vaccinate if not immune  - All patients with liver disease, particularly those with cholestatic liver disease, are at an increased risk for osteoporosis.  We strongly recommend screening for Vitamin D deficiency at least twice yearly with aggressive supplementation/replacement as indicated.    - We also recommend a screening DEXA scan to evaluate for osteoporosis.  If present, should treat with calcium, Vitamin D supplementation, and recommend consideration of bisphosphonate therapy.  Also recommend follow up DEXA scans to evaluate for improvement of bone density on therapy.  - All patients with liver disease should avoid the use of Non-steroidal Anti-Inflammatory (NSAID) medications as they can cause significant injury to the kidneys in this population    Follow Up:  3 months     Thank you very much for the opportunity to participate in the care of this patient.  If you have any further questions, please don't hesitate to contact our office.    Thomas M. Leventhal,  M.D.   of Medicine  Advanced & Transplant Hepatology  [facility]        Again, thank you for allowing me to participate in the care of your patient.        Sincerely,        Thomas M. Leventhal, MD

## 2022-02-08 NOTE — PATIENT INSTRUCTIONS
Labs today    Will make decisions about transplant and diuretics (water pills)    Cirrhosis Education  Nutrition  - For patients with cirrhosis, it is very important to eat the right types and amounts of foods.  We recommend a diet low in carbohydrates/sugars and high in fresh fruits/vegetables, with the right amount of protein.  We typically recommend 1.5gm/kg/day of protein, or  grams of protein every day.  - In regard to protein intake, you can focus on: All meats, cooked fish and seafood, vegetable-based protein meat products, tofu, eggs, legumes, dairy products (including milk and yogurt and cheese), unsalted nuts.  - You should eat at least three meals a day and three to four snacks between meals  - Bedtime snacks are especially important (preferrably something with some protein)    - Patients with malnutrition and/or loss of muscular mass can improve their nutrition and muscular mass by drinking two cans of dietary supplements daily, particularly at bedtime.  These would include: Ensure, Boost, [place] Instant Milk, Glucerna, (or similar supplements)  - Please avoid eating raw seafood, especially shellfish, because of risk of serious illness  - We recommend all patients with cirrhosis limit their daily sodium intake to less than 2,000mg      General Liver Health  - Continue to avoid the use of all non-steroid anti-inflammatory medicines (ibuprofen, Aleve, naproxen, aspirin, etc.) as this can cause serious injury to your kidneys in the setting of liver disease  - If you see a doctor and they prescribe you a new medication, please contact our clinic to let us know what changes are being made  - If you become acutely ill and present to an ER or are admitted to a hospital, please let us know as soon as you are able.  - We recommend that all patients with chronic liver disease be vaccinated against hepatitis A and B.  Please discuss with your primary provider the need for these vaccines  - As patients with  liver disease are at an increased risk of developing osteoporosis, we recommend that you have a DEXA scan with appropriate follow up and treatment.   - We recommend screening for Vitamin D deficiency (at least yearly) and aggressive replacement/supplementation as warranted.    Sleep Troubles:  - Sleep issues are very common in patients with chronic liver disease  - Recommend strict avoidance of medications such as narcotics, sedatives and sleep aids.    - Low dose benadryl or melatonin can be used for insomnia, if needed.

## 2022-02-08 NOTE — Clinical Note
Can we please open a transplant eval on this gentleman.  I won't need to see him on the eval    ETOH cirrhosis, sober 3+ years, MELD 25

## 2022-02-08 NOTE — CONFIDENTIAL NOTE
MRI faxed to [facility]. [facility] will reach out to patient to schedule. Asked that pt ideally have a paracentesis 48 hours prior to MRI.    Maxine ROBERTO LPN  Hepatology Clinic      ------  Leventhal, Thomas Michael, MD [place], DIMITRY [place]  Could i trouble you to fax order for MRI to [facility]?  Ideally he is having a paracentesis within the previous 48 hours to MRI.

## 2022-02-08 NOTE — NURSING NOTE
"Chief Complaint   Patient presents with     Consult     Alcoholic cirrhosis of the liver     BP 92/58   Pulse 63   Ht 1.727 m (5' 8\")   Wt 80.6 kg (177 lb 9.6 oz)   SpO2 100%   BMI 27.00 kg/m        Jill Pink MA  "

## 2022-02-08 NOTE — Clinical Note
Could i trouble you to fax order for MRI to [facility]?  Ideally he is having a paracentesis within the previous 48 hours to MRI.    THanks

## 2022-02-11 NOTE — LETTER
PHYSICIAN ORDERS    DATE & TIME ISSUED: March 3, 2022 2:23 PM  PATIENT NAME: Trey Oneill   : 1965     [facility] MR# : [id number]     DIAGNOSIS:  cirrhosis  ICD-10 CODE: K70.31   Orders  1 year after issue.  Please have done for osteoporosis screening as part of liver transplant work up  These labs must be drawn all together on the same day when done:       Dexa scan hip/pelvis/spine     PLEASE FAX RESULTS AS SOON AS POSSIBLE TO [phone], ATTN:LabDE Thomas M. Leventhal, M.D.   of Medicine  Advanced & Transplant Hepatology  [facility]

## 2022-02-11 NOTE — LETTER
Trey Oneill  [address] [place] [facility] [address]          Dear Trey,    Thank you for your interest in the Transplant Center at [facility]. We look forward to being a part of your care team and assisting you through the transplant process.    As we discussed, your transplant coordinator is Mehdi Choudhury Jr. (Liver).  You may call your coordinator at any time with questions or concerns call [phone].    Please complete the following.    1. Fill out and return the enclosed forms    Authorization for Electronic Communication    Authorization to Discuss Protected Health Information    Authorization for Release of Protected Health Information    Authorization for Care Everywhere Release of Information    2. Sign up for:    Gatekeeper Systemt, access to your electronic medical record (see enclosed pamphlet)    [email or web address], a transplant education website    You can use these tools to learn more about your transplant, communicate with your care team, and track your medical details      Sincerely,  Solid Organ Transplant  [facility]    cc: Referring Physician and PCP

## 2022-02-11 NOTE — TELEPHONE ENCOUNTER
Patient Call: General  Route to LPN    Reason for call: Patient returning missed call,    Call back needed? Yes  Return Call Needed  Same as documented in contacts section

## 2022-02-24 NOTE — TELEPHONE ENCOUNTER
Patient was asked the following questions during liver intake call.     Referring Provider: Miki Washington MD  Referring Diagnosis: Alcoholic Cirrhosis of the Liver.   PCP: Nigel Young MD    1)Do you know why you have liver disease: Yes       If Alcoholic Cirrhosis is present when was your last drink: August 2021       Have you ever been through treatment for alcohol: No  2) Presence of Ascites: Yes Paracentesis: Yes  3) Presence of Hepatic Encephalopathy: Yes Medications: Yes  4) History of GI Bleeding: Yes  5) Oxygen Use: No  6) EGD: Yes Where: [facility] When: 2021  7) Colonoscopy: Yes Where: [facility] When: 2021  8) MELD Score: 25  9) Labs available for review from PCP/GI: Yes  10) HCC Diagnosis: No  11)Insurance information: Unblab       Policy lee: Self       Subscriber/policy/ID number: [id number]      Group Number: [id number]    Referral intake process completed.  Patient is aware that after financial approval is received, medical records will be requested.   Patient confirmed for a callback from transplant coordinator on 3/2/22.  Tentative evaluation date TBD.    Confirmed coordinator will discuss evaluation process in more detail at the time of their call.   Patient is aware of the need to arrange age appropriate cancer screening, vaccinations, and dental care.  Reminded patient to complete questionnaire, complete medical records release, and review packet prior to evaluation visit .  Assessed patient for special needs (ie--wheelchair, assistance, guardian, and ):  None  Patient instructed to call [phone] with questions.     Patient gave verbal consent during intake call to obtain medical records and documents outside of [facility]:  Yes     PAM Durham, LPN       Solid Organ Transplant

## 2022-03-02 NOTE — TELEPHONE ENCOUNTER
Referral from Dr. Miki Washington    MELD 25    Worked last July - has not filed for SSDI yet    Ascites - yes  Taps - yes every couple weeks  HE - yes  GIB  EGD - yes w/ banding  Colonoscopy - 2021 w/ poor prep    Patient has been sober since 8/2021 per mom, but patient reports differently    See note below which is from Dr. Leventhal consult:  Assessment and Plan: Cirrhosis:    -Secondary to longstanding history of alcohol use disorder  -Sober for approximately 40 years  Has decompensated disease as evidenced by bleeding esophageal varices, ascites, hepatic encephalopathy  -Current M ELD based on today's labs of 25  -Based on the above we will refer for formal transplant evaluation  -He is encouraged on his ongoing sobriety     Severe alcohol use disorder; in sustained remission  -Reports no alcohol since 2018 (might not be true)  -Encouraged on sobriety to date    Plan: eval asap. Will start with CD eval and basic stuff until CD evaluation & social work consult is complete.   Friday's not the best, most other days. - noneavl day    March 2, 2022 2:23 PM - Mehdi Choudhury Jr. RN:   MTP sent now to:  [email or web address]    Trey Oneill is a 56 year old male with past medical history of severe alcohol use disorder in sustained remission complicated with the development of alcohol-related cirrhosis complicated by bleeding esophageal varices, anemia, ascites who presents to establish care.     Notes he was diagnosed with cirrhosis back in 2018 when he presented to the hospital with an acute upper GI bleed and hematemesis.  Endoscopy at that time demonstrated large varices and he underwent band ligation at that time he was drinking approximately 12 beers per day, and had done so for many years prior.  He embrace sobriety and has not had any alcohol since.  Compensation with the recent onset of encephalopathy for which he has been prescribed lactulose ongoing varices noted on endoscopy that have been slow to respond to  band ligation, and ascites which has been somewhat refractory to diuretics (main side effect of dizziness and hypotension) and is requiring a paracentesis every 1 to 2 weeks.     Transplant he had previously been on beta-blocker, but this was discontinued during her recent hospitalization for hypotension.     He underwent drainage of a hydrocele in late January 2022     Denies any overt medical history prior to the onset of his cirrhosis.  Denies significant surgical history.  He was a smoker but quit for 5 years ago.  Denies a history of IV or inhaled drugs.  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

## 2022-03-14 NOTE — PROGRESS NOTES
[facility]  Outpatient MNT: Liver Transplant Evaluation    Current BMI: 30.3 (HT 68 in,  lbs/91 kg)  BMI is within recommendation of <45 for liver transplant    Fried Frailty, 1st screenshot--  Frail (3/5 points) scored for unintentional wt loss, reported exhaustion, reduced     FraiLT, 2nd screenshot-- Frail  [email or web address]    Recommended Interventions to Address Frailty:  - Increased protein intake  - Consider PT           Time Spent: 30 minutes  Visit Type: Initial   Referring Physician: Anabel   Pt accompanied by: his momTamika     History of previous txp: none     Nutrition Assessment  eGFR 40-50    - Appetite: fair 2/2 ascites   - Food allergies/intolerances: none   - Meal prep & grocery shopping: mom; has been living with mom since August   - Previous RD education: no   - Issues chewing or swallowing: no   - N/V/D/C: occasional nausea r/t liver disease   - Food access concerns: not asked     Vitamins, Supplements, Pertinent Meds: iron, thiamin, starting vit K tonight per an MD recommendation  Herbal Medicines/Supplements: none   Protein supplement: Boost and a protein bar- started a few months ago; 2 drinks/day + occasional additional protein bar     Edema: + ascites + MIMI     Weight hx: prior ubw 215 lbs   muscle wasting significant- noted in temporal region, back and shoulder     Diet Recall  Breakfast Eggs x 2    Lunch Grazes on fruit (total of 2-4 svgs/day), yoplait yogurt, occasional cookie/half sweet roll, ice cream/sherbet    Dinner Meat (chicken/beef/fish- 2 oz) + veggie + potatoes; total meal portion is <1 cup   Snacks HS- yogurt w/fruit    Beverages Water, juice (not every day), ginger ale (helps with Nausea), almond milk    Dining out 1x/week      Physical Activity  Low energy level    Has to sit down in the shower  No activity at baseline as an adult, but energy level has declined    Malnutrition   % Intake: reduced, but unsure  duration and by specific %  % Weight Loss: yes, but difficult to assess w/ fluid status   Subcutaneous Fat Loss: Mild   Muscle Loss: Mod/Severe  Fluid Accumulation/Edema: Severe   Malnutrition Diagnosis: Severe malnutrition in the context of chronic illness     Anthropometrics  IBW/%IBW: 154/129  Dosing wt: 165 lbs/75 kg    Estimated Nutrition Needs   Protein: 75-90 grams/day (1-1.2 g/kg) for increased needs w/ liver disease with slightly reduced eGFR     Nutrition Diagnosis  Predicted suboptimal nutrient intake (protein) r/t increased needs w/ liver disease, reduced appetite/preference for foods impacting oral protein intake.    Nutrition Intervention  Nutrition education provided:  Discussed sodium intake (low sodium foods and drinks, seasoning food without salt and tips for low sodium diet).    Reviewed adequate protein intake. Encouraged receiving protein from both animal and plant based sources. Double check protein content of current Boost drinks, ask they vary from 9-30 g protein. Recommend some other swaps to increase protein intake (cow's over almond milk, Greek over yoplait yogurt, etc).     Reviewed functional status and how diet and activity help improve this. Pt has PT exercises at home, but doesn't do them and mom reports he likely will not be self-motivated to perform these.     Reviewed post txp diet guidelines in brief (will review in further detail post txp):  (1) Review of proper food safety measures d/t immunosuppressant therapy post-op and increased risk for food-borne illness    (2) Avoid the following post txp d/t risk for rejection, unknown effects on the organs, and/or potential interactions with immunosuppressants:  - Herbal, Chinese, holistic, chiropractic, natural, alternative medicines and supplements  - Detoxes and cleanses  - Weight loss pills  - Protein powders or other products with extracts or herbs (ie green tea extract)    (3) Med regimen and possible side effects    Patient  Understanding: Pt verbalized understanding of education provided.  Expected Engagement: Good  Follow-Up Plans: PRN     Nutrition Goals  Ideal minimum protein intake 75 g/day    Che Nance RD, LD, CCTD

## 2022-03-15 NOTE — TELEPHONE ENCOUNTER
DATE:  3/15/2022     TIME OF RECEIPT FROM LAB: 084    LAB TEST: hgb    LAB VALUE: 6.9    RESULTS GIVEN WITH READ-BACK TO:Daniel Choudhury    TIME LAB VALUE REPORTED TO PROVIDER:0846  Lab phone # [phone]

## 2022-03-15 NOTE — PROGRESS NOTES
Psychosocial Assessment  Trey Oneill was seen in the Transplant Center as part of he evaluation as a potential liver transplant recipient.  He presented to clinic with his mother, Tamika.   Living Situation: Trey lives in [place], [place] with his mother Tamika. He has resided there since 2021, when he no longer could manage on his own due to his physical health.   [place], [place] is about 133 miles away from our transplant center. I discussed recommendation to remain local post transplant with a caregiver. Tamika would be his primary caregiver and would be able to remain local post transplant. I discussed potential funding through the county due to his insurance. I encourage them to connect with his county regarding funding. I provided local lodging options.   Education/Employment:  Trey complete high school and some college. He last worked in 2021 and has mostly worked in the  industry. He is not a .   Financial /Income: Trey does not have a source of income at this time. I discussed applying for social security disability. His mother is financially supporting Trey.   Health Insurance:  Trey has [employer] PMAP. This writer talked with Trey about the financial risks of transplant, particularly about the high cost of transplant related medications and the importance of maintaining adequate health insurance coverage.  Family/Social Support: Trey is single and does not have any children. He has one brother, Jose who lives in [place]. His father is  and he lives with his mother. Trey reports many cousins who would be able to provide PRN support.   His mother Tamika would be his primary caregiver.    This writer stressed the importance of having a stable and involved support network before and after transplant.  Provided Trey  with education about the relationship between a stable support system and better surgical and post-transplant outcomes compared to patients with a limited  support system.    Functional Status: Trey uses a walker to ambulate. He mother manages his medications and he is not driving.   Chemical Dependency:  Trey reports his last consumption of alcohol was August 2021. Prior to that he would drink about a 2-12 beers per day. He reports several providers advising him to abstain or cut back from alcohol. Trey has a history of tobacco use and discontinued use in 2016. No prior concerns related to misuse/overuse of pain medication or other illicit substances.   No history of treatment related to substance use. He reports a reckless driving ticket related to alcohol in 1985.   Mental Health: No concerns related to mental health. No history of psychotherapy, psychiatric hospitalizations or suicidal thought/attempts.   Adjustment to Illness:  This writer provided Trey  with supportive counseling throughout this interview.  This writer also encouraged Trey to attend the liver transplant support group for additional support and encouragement.   Impression/Recommendations:   Trey verbalizes understanding the psychosocial risks of transplant and teaching provided during this evaluation.  Trey has met the sobriety criteria recommended for listing, however this writer recommends Trey complete a chemical dependency evaluation and any treatment recommendations. TWILA Escobar to follow to assist with patient's substance use disorder assessment and treatment.   Trey's mother, Tamika will be his primary caregiver.   Trey has health insurance for transplant, and an intact support system. His mother is financially supporting him at this time,   This writer will remain available to assist patient throughout the evaluation process and will follow patient through transplant if he is listed.  It was a pleasure to evaluate this patient for liver transplant.   Teaching completed during assessment:  1.     Housing and relocation needs post transplant.  2.     Caregiver needs post  transplant.  3.     Financial issues related to transplant.  4.     Risks of alcohol use post transplant.  5.     Common psychosocial stressors pre/post transplant.        6.     Liver Transplant support group availability.        7.     [employer] Directive             Psychosocial Risks of Transplant Reviewed:  1.     Increased stress related to your emotional, family, social, employment, or   financial situation.  2.     Affect on work and/or disability benefits.  3.     Affect on future health and life insurance.  4.     Transplant outcome expectations may not be met.  5.     Mental Health risks: anxiety, depression, PTSD, guilt, grief and chronic fatigue.     QUINTEN Hollins, LICSW

## 2022-03-15 NOTE — PROGRESS NOTES
"\"Much or all of the text in this note was generated through the use of Dragon Dictate voice to text software. Errors in spelling or words which appear to be out of context are unintentional, may be present due having escaped editing\"    Assessment and Plan:  1. liver transplant evaluation - patient is a fair candidate overall. Benefits and surgical risks of a liver transplantation were discussed.  2.  End stage liver disease due to Laennec's    Surgical evaluation:  1. Portal Vein:Patent  2. Hepatic Artery: Open  3. TIPS: absent  4. Previous Abdominal Surgery: Yes hernia repair 2  Years ago  5. Hepatocellular Carcinoma: None, needs a repeat MRI  6. Ascites: Present - large amount  7. Costal Angle: wide  8. Portopulmonary Hypertension: absent  9. Hepatopulmonary Syndrome: absent  10. Cardiac Evaluation: needs stress echocardiogram  11. Nutritional Status: Poor  12. Diabetes: NO  13.Hypertension PAST  14. Smoker:no  14: Fraility index:yes  15. Meets guidelines to receive Living Donor  Yes -  ....  16. Potential Living donors yes  MELD-Na score: 18 at 3/15/2022  8:05 AM  MELD score: 18 at 3/15/2022  8:05 AM  Calculated from:  Serum Creatinine: 1.52 mg/dL at 3/15/2022  8:05 AM  Serum Sodium: 137 mmol/L at 3/15/2022  8:05 AM  Total Bilirubin: 2.0 mg/dL at 3/15/2022  8:05 AM  INR(ratio): 1.58 at 3/15/2022  8:05 AM  Age: 57 years  Recommendations and assessment:   In summary: Patient is a 51-year-old with end-stage liver disease secondary to Laënnec cirrhosis, he looks much older than his stated age.  He has decompensated in the form of ascites and has significant muscle wasting.  He has patent vasculature.  No known heart disease.  He has a suspected liver lesion, an MRI should be done to rule out any HCC.  His meld score is 18.  He certainly could be a living donor candidate.  He has a brother who is age 52 years who is extremely fit.  I told him we could certainly evaluate the brother.  He is malnourished and frail and " he would certainly benefit from nutritional support and outpatient physical therapy.      Patients overall evaluation will be discussed at the Liver Transplant selection committee meeting with a final recommendation on the patients suitability for transplant to be made at that time.    Consult Full  Details:  Trey Oneill was seen in consultation at the request of Dr. Thomas Leventhal for evaluation as a potential liver transplant recipient.    Reason for Visit:  Trey Oneill is a 57 year old year old male with Laennec's, who presents for liver transplant evaluation.    HPI:  Presenting complaint: Abdominal distention    Patient has been diagnosed to have Laënnec cirrhosis in 2018.  He decompensated in the form of variceal bleed the varices were banded.  He also has large amount of ascites and is getting tapped every other week.  He does give history of encephalopathy.  He is a  by occupation and does not seem to be able to work.  He does have significant muscle loss.  No known heart disease or lung disease.  He is now abstaining from alcohol.  His mother was present during the visit and seem to be supportive.    Past Medical History:   Diagnosis Date     Alcohol use disorder, severe, in sustained remission (H)      Alcoholic cirrhosis of liver with ascites (H)      Hepatic encephalopathy (H)      History of blood transfusion      Protein-calorie malnutrition, moderate (H)      No past surgical history on file.  No past surgical history on file.  Family History   Problem Relation Age of Onset     Substance Abuse Father      Substance Abuse Cousin      Liver Disease Cousin      Allergies   Allergen Reactions     Sulfa Drugs Hives     Prior to Admission medications    Medication Sig Start Date End Date Taking? Authorizing Provider   ferrous sulfate (FEROSUL) 325 (65 Fe) MG tablet Take 325 mg by mouth 8/20/21  Yes Reported, Patient   furosemide (LASIX) 20 MG tablet  7/26/21  Yes Reported, Patient    lactulose (CHRONULAC) 10 GM/15ML solution Take 20 mLs by mouth 8/25/21  Yes Reported, Patient   NYSTOP 225365 UNIT/GM powder  12/19/21  Yes Reported, Patient   pantoprazole (PROTONIX) 40 MG EC tablet Take 40 mg by mouth 10/20/21  Yes Reported, Patient   prochlorperazine (COMPAZINE) 5 MG tablet Take 5 mg by mouth   Yes Reported, Patient   sucralfate (CARAFATE) 1 GM tablet  12/19/21  Yes Reported, Patient   thiamine (B-1) 100 MG tablet Take 100 mg by mouth   Yes Reported, Patient       Previous Transplant Hx: No    Cardiovascular Hx:       h/o Cardiac Issues: No       Exercise Tolerance: shortness of breath with exertion.    Potential Donor(s): Yes -  52-year-old brother.    ROS:    REVIEW OF SYSTEMS (check box if normal)  [x]                GENERAL  [x]                  PULMONARY [x]                 GENITOURINARY  [x]                 CNS                 [x]                  CARDIAC  [x]                  ENDOCRINE  [x]                 EARS,NOSE,THROAT [x]                  GASTROINTESTINAL [x]                  NEUROLOGIC    [x]                 MUSCLOSKELTAL  [x]                   HEMATOLOGY    Examination:     Vitals:  /67   Pulse 93   Wt 90.5 kg (199 lb 8 oz)   SpO2 100%   BMI 30.33 kg/m      GENERAL APPEARANCE: alert and no distress  EYES: PERRL  HENT: mouth without ulcers or lesions  NECK: supple, no adenopathy  RESP: lungs clear to auscultation - no rales, rhonchi or wheezes  CV: regular rhythm, normal rate, no rub   ABDOMEN:  soft, nontender, white costal angle, large amount of ascites present.  No HSM or masses and bowel sounds normal  MS: extremities normal- no gross deformities noted, no evidence of inflammation in joints, no muscle tenderness  SKIN: no rash  NEURO: Normal strength and tone, sensory exam grossly normal, mentation intact and speech normal  PSYCH: mentation appears normal. and affect normal/bright      Results:   Recent Results (from the past 168 hour(s))   Basic metabolic panel     Collection Time: 03/15/22  8:05 AM   Result Value Ref Range    Sodium 137 133 - 144 mmol/L    Potassium 3.4 3.4 - 5.3 mmol/L    Chloride 104 94 - 109 mmol/L    Carbon Dioxide (CO2) 21 20 - 32 mmol/L    Anion Gap 12 3 - 14 mmol/L    Urea Nitrogen 37 (H) 7 - 30 mg/dL    Creatinine 1.52 (H) 0.66 - 1.25 mg/dL    Calcium 8.0 (L) 8.5 - 10.1 mg/dL    Glucose 138 (H) 70 - 99 mg/dL    GFR Estimate 53 (L) >60 mL/min/1.73m2   Hepatic panel    Collection Time: 03/15/22  8:05 AM   Result Value Ref Range    Bilirubin Total 2.0 (H) 0.2 - 1.3 mg/dL    Bilirubin Direct 1.2 (H) 0.0 - 0.2 mg/dL    Protein Total 5.8 (L) 6.8 - 8.8 g/dL    Albumin 2.2 (L) 3.4 - 5.0 g/dL    Alkaline Phosphatase 205 (H) 40 - 150 U/L    AST 38 0 - 45 U/L    ALT 26 0 - 70 U/L   Phosphorus    Collection Time: 03/15/22  8:05 AM   Result Value Ref Range    Phosphorus 3.0 2.5 - 4.5 mg/dL   Prostate spec antigen screen    Collection Time: 03/15/22  8:05 AM   Result Value Ref Range    Prostate Specific Antigen Screen 0.88 0.00 - 4.00 ug/L   TSH with free T4 reflex    Collection Time: 03/15/22  8:05 AM   Result Value Ref Range    TSH 4.86 (H) 0.40 - 4.00 mU/L   INR    Collection Time: 03/15/22  8:05 AM   Result Value Ref Range    INR 1.58 (H) 0.85 - 1.15   CBC with platelets    Collection Time: 03/15/22  8:05 AM   Result Value Ref Range    WBC Count 8.3 4.0 - 11.0 10e3/uL    RBC Count 2.52 (L) 4.40 - 5.90 10e6/uL    Hemoglobin 6.9 (LL) 13.3 - 17.7 g/dL    Hematocrit 23.0 (L) 40.0 - 53.0 %    MCV 91 78 - 100 fL    MCH 27.4 26.5 - 33.0 pg    MCHC 30.0 (L) 31.5 - 36.5 g/dL    RDW 18.2 (H) 10.0 - 15.0 %    Platelet Count 140 (L) 150 - 450 10e3/uL   Adult Type and Screen    Collection Time: 03/15/22  8:05 AM   Result Value Ref Range    ABO/RH(D) O POS     Antibody Screen Negative Negative    SPECIMEN EXPIRATION DATE [id number]    T4 free    Collection Time: 03/15/22  8:05 AM   Result Value Ref Range    Free T4 1.06 0.76 - 1.46 ng/dL   General PFT Lab (Please always  keep checked)    Collection Time: 03/15/22  8:35 AM   Result Value Ref Range    FVC-Pred 4.40 L    FVC-Pre 4.04 L    FVC-%Pred-Pre 91 %    FEV1-Pre 3.25 L    FEV1-%Pred-Pre 94 %    FEV1FVC-Pred 78 %    FEV1FVC-Pre 81 %    FEFMax-Pred 8.92 L/sec    FEFMax-Pre 6.85 L/sec    FEFMax-%Pred-Pre 76 %    FEF2575-Pred 2.96 L/sec    FEF2575-Pre 3.09 L/sec    EET0738-%Pred-Pre 104 %    ExpTime-Pre 6.90 sec    FIFMax-Pre 4.17 L/sec    VC-Pred 4.69 L    VC-Pre 3.38 L    VC-%Pred-Pre 72 %    IC-Pred 3.75 L    IC-Pre 2.98 L    IC-%Pred-Pre 79 %    ERV-Pred 0.94 L    ERV-Pre 0.41 L    ERV-%Pred-Pre 43 %    FEV1FEV6-Pred 79 %    FEV1FEV6-Pre 81 %    DLCOunc-Pred 26.29 ml/min/mmHg    DLCOunc-Pre 22.53 ml/min/mmHg    DLCOunc-%Pred-Pre 85 %    VA-Pre 6.39 L    VA-%Pred-Pre 104 %    FEV1SVC-Pred 73 %    FEV1SVC-Pre 96 %     I had a long discussion with the patient regarding liver transplantation which included but was not limited to  the following points:    1. Liver transplant selection committee process.  2. The federal rules for cadaveric waiting list, the size and blood type matching of the organ. The availability of living-related donor transplantation.  3. The types of donors: brain death donors, non-heart beating donors, partial liver grafts: splits and living donor grafts  4. Extended criteria  Donors (older age, steasosis) and the increased  risk of primary non-function using the extended criteria donors  5. The CDC high risk donors,  Risk of donor transmitted infections and donor transmitted malignancy  6. The liver transplant operation and the associated risks and technical complications which can include intraoperative death, post operative death,  Primary non-function, bleeding requiring re-operations, arterial and biliary complications, bowel perforations, and intra abdominal abscess. Some of these complicaitons may require a second operation.  7. The postoperative course, the ICU stay and risk of postoperative  complications which can include sepsis, MI, stroke, brain injury, pneumonia, pleural effusions, and renal dysfunction.  8. The current 1 year and 5 year graft and patient survivals.  9. The need for life long immunosuppressive therapy and the side effects of these medications, including the possibility of toxicity, opportunistic infections, risk of cancer including lymphoma, and the possibility of rejection even if the patient is taking the medication exactly as prescribed.  10. The need for compliance with medications and follow-up visits in the clinic and thereafter.  11. The patient and family understand these risks and wish to proceed to transplantation       Review of prior external note(s) from - epic  60 minutes spent on the date of the encounter doing chart review, history and exam, documentation and further activities per the note

## 2022-03-15 NOTE — PROGRESS NOTES
"Met with Trey and mother (Tamika) for consult regarding his recent use of alcohol (if any). Clarified that Trey admits drinking \"a few beers\" in August, 2021 - but   had not been drinking prior to that for a couple of years. His mother disputed that account however and indicated she had cleaned out \"cases of beer\" empty cans  from his old apartment where he was living by himself to her home. When questioned a little deeper, Trey indicated he drank every day prior to August, 2021 -  more when not working (\"got hammered every day\"), less when working.     I said I did not have an appointment set up for him to do an ALLYSSA Assessment with him today - but I will find him an ALLYSSA provider in [place], [place] where he can   Have a face to face ALLYSSA assessment and agreed to have his mother go with him to \"fill in the blanks\" and provide collateral history. I believe there may be some  HE going on with Trey, but that is out of scope for me to determine. Trey agreed to the plan.     Samuel Peña, LADC  "

## 2022-03-15 NOTE — TELEPHONE ENCOUNTER
Patient asymptomatic    Patient reports he is getting a tap and transfusion tomorrow back home.    Shared with Dr. Leventhal

## 2022-03-15 NOTE — LETTER
"3/15/2022     RE: Trey Oneill  [address]  [facility] [address]    Dear Colleague,    Thank you for referring your patient, Trey Oneill, to the [facility]. Please see a copy of my visit note below.    \"Much or all of the text in this note was generated through the use of Dragon Dictate voice to text software. Errors in spelling or words which appear to be out of context are unintentional, may be present due having escaped editing\"    Assessment and Plan:  1. liver transplant evaluation - patient is a fair candidate overall. Benefits and surgical risks of a liver transplantation were discussed.  2.  End stage liver disease due to Laennec's    Surgical evaluation:  1. Portal Vein:Patent  2. Hepatic Artery: Open  3. TIPS: absent  4. Previous Abdominal Surgery: Yes hernia repair 2  Years ago  5. Hepatocellular Carcinoma: None, needs a repeat MRI  6. Ascites: Present - large amount  7. Costal Angle: wide  8. Portopulmonary Hypertension: absent  9. Hepatopulmonary Syndrome: absent  10. Cardiac Evaluation: needs stress echocardiogram  11. Nutritional Status: Poor  12. Diabetes: NO  13.Hypertension PAST  14. Smoker:no  14: Fraility index:yes  15. Meets guidelines to receive Living Donor  Yes -  ....  16. Potential Living donors yes  MELD-Na score: 18 at 3/15/2022  8:05 AM  MELD score: 18 at 3/15/2022  8:05 AM  Calculated from:  Serum Creatinine: 1.52 mg/dL at 3/15/2022  8:05 AM  Serum Sodium: 137 mmol/L at 3/15/2022  8:05 AM  Total Bilirubin: 2.0 mg/dL at 3/15/2022  8:05 AM  INR(ratio): 1.58 at 3/15/2022  8:05 AM  Age: 57 years  Recommendations and assessment:   In summary: Patient is a 51-year-old with end-stage liver disease secondary to Laënnec cirrhosis, he looks much older than his stated age.  He has decompensated in the form of ascites and has significant muscle wasting.  He has patent vasculature.  No known heart disease.  He has a suspected liver lesion, an MRI should be done to rule " out any HCC.  His meld score is 18.  He certainly could be a living donor candidate.  He has a brother who is age 52 years who is extremely fit.  I told him we could certainly evaluate the brother.  He is malnourished and frail and he would certainly benefit from nutritional support and outpatient physical therapy.      Patients overall evaluation will be discussed at the Liver Transplant selection committee meeting with a final recommendation on the patients suitability for transplant to be made at that time.    Consult Full  Details:  Trey Oneill was seen in consultation at the request of Dr. Thomas Leventhal for evaluation as a potential liver transplant recipient.    Reason for Visit:  Trey Oneill is a 57 year old year old male with Laennec's, who presents for liver transplant evaluation.    HPI:  Presenting complaint: Abdominal distention    Patient has been diagnosed to have Laënnec cirrhosis in 2018.  He decompensated in the form of variceal bleed the varices were banded.  He also has large amount of ascites and is getting tapped every other week.  He does give history of encephalopathy.  He is a  by occupation and does not seem to be able to work.  He does have significant muscle loss.  No known heart disease or lung disease.  He is now abstaining from alcohol.  His mother was present during the visit and seem to be supportive.    Past Medical History:   Diagnosis Date     Alcohol use disorder, severe, in sustained remission (H)      Alcoholic cirrhosis of liver with ascites (H)      Hepatic encephalopathy (H)      History of blood transfusion      Protein-calorie malnutrition, moderate (H)      No past surgical history on file.  No past surgical history on file.  Family History   Problem Relation Age of Onset     Substance Abuse Father      Substance Abuse Cousin      Liver Disease Cousin      Allergies   Allergen Reactions     Sulfa Drugs Hives     Prior to Admission medications     Medication Sig Start Date End Date Taking? Authorizing Provider   ferrous sulfate (FEROSUL) 325 (65 Fe) MG tablet Take 325 mg by mouth 8/20/21  Yes Reported, Patient   furosemide (LASIX) 20 MG tablet  7/26/21  Yes Reported, Patient   lactulose (CHRONULAC) 10 GM/15ML solution Take 20 mLs by mouth 8/25/21  Yes Reported, Patient   NYSTOP 063237 UNIT/GM powder  12/19/21  Yes Reported, Patient   pantoprazole (PROTONIX) 40 MG EC tablet Take 40 mg by mouth 10/20/21  Yes Reported, Patient   prochlorperazine (COMPAZINE) 5 MG tablet Take 5 mg by mouth   Yes Reported, Patient   sucralfate (CARAFATE) 1 GM tablet  12/19/21  Yes Reported, Patient   thiamine (B-1) 100 MG tablet Take 100 mg by mouth   Yes Reported, Patient       Previous Transplant Hx: No    Cardiovascular Hx:       h/o Cardiac Issues: No       Exercise Tolerance: shortness of breath with exertion.    Potential Donor(s): Yes -  52-year-old brother.    ROS:    REVIEW OF SYSTEMS (check box if normal)  [x]                GENERAL  [x]                  PULMONARY [x]                 GENITOURINARY  [x]                 CNS                 [x]                  CARDIAC  [x]                  ENDOCRINE  [x]                 EARS,NOSE,THROAT [x]                  GASTROINTESTINAL [x]                  NEUROLOGIC    [x]                 MUSCLOSKELTAL  [x]                   HEMATOLOGY    Examination:     Vitals:  /67   Pulse 93   Wt 90.5 kg (199 lb 8 oz)   SpO2 100%   BMI 30.33 kg/m      GENERAL APPEARANCE: alert and no distress  EYES: PERRL  HENT: mouth without ulcers or lesions  NECK: supple, no adenopathy  RESP: lungs clear to auscultation - no rales, rhonchi or wheezes  CV: regular rhythm, normal rate, no rub   ABDOMEN:  soft, nontender, white costal angle, large amount of ascites present.  No HSM or masses and bowel sounds normal  MS: extremities normal- no gross deformities noted, no evidence of inflammation in joints, no muscle tenderness  SKIN: no rash  NEURO:  Normal strength and tone, sensory exam grossly normal, mentation intact and speech normal  PSYCH: mentation appears normal. and affect normal/bright      Results:   Recent Results (from the past 168 hour(s))   Basic metabolic panel    Collection Time: 03/15/22  8:05 AM   Result Value Ref Range    Sodium 137 133 - 144 mmol/L    Potassium 3.4 3.4 - 5.3 mmol/L    Chloride 104 94 - 109 mmol/L    Carbon Dioxide (CO2) 21 20 - 32 mmol/L    Anion Gap 12 3 - 14 mmol/L    Urea Nitrogen 37 (H) 7 - 30 mg/dL    Creatinine 1.52 (H) 0.66 - 1.25 mg/dL    Calcium 8.0 (L) 8.5 - 10.1 mg/dL    Glucose 138 (H) 70 - 99 mg/dL    GFR Estimate 53 (L) >60 mL/min/1.73m2   Hepatic panel    Collection Time: 03/15/22  8:05 AM   Result Value Ref Range    Bilirubin Total 2.0 (H) 0.2 - 1.3 mg/dL    Bilirubin Direct 1.2 (H) 0.0 - 0.2 mg/dL    Protein Total 5.8 (L) 6.8 - 8.8 g/dL    Albumin 2.2 (L) 3.4 - 5.0 g/dL    Alkaline Phosphatase 205 (H) 40 - 150 U/L    AST 38 0 - 45 U/L    ALT 26 0 - 70 U/L   Phosphorus    Collection Time: 03/15/22  8:05 AM   Result Value Ref Range    Phosphorus 3.0 2.5 - 4.5 mg/dL   Prostate spec antigen screen    Collection Time: 03/15/22  8:05 AM   Result Value Ref Range    Prostate Specific Antigen Screen 0.88 0.00 - 4.00 ug/L   TSH with free T4 reflex    Collection Time: 03/15/22  8:05 AM   Result Value Ref Range    TSH 4.86 (H) 0.40 - 4.00 mU/L   INR    Collection Time: 03/15/22  8:05 AM   Result Value Ref Range    INR 1.58 (H) 0.85 - 1.15   CBC with platelets    Collection Time: 03/15/22  8:05 AM   Result Value Ref Range    WBC Count 8.3 4.0 - 11.0 10e3/uL    RBC Count 2.52 (L) 4.40 - 5.90 10e6/uL    Hemoglobin 6.9 (LL) 13.3 - 17.7 g/dL    Hematocrit 23.0 (L) 40.0 - 53.0 %    MCV 91 78 - 100 fL    MCH 27.4 26.5 - 33.0 pg    MCHC 30.0 (L) 31.5 - 36.5 g/dL    RDW 18.2 (H) 10.0 - 15.0 %    Platelet Count 140 (L) 150 - 450 10e3/uL   Adult Type and Screen    Collection Time: 03/15/22  8:05 AM   Result Value Ref Range     ABO/RH(D) O POS     Antibody Screen Negative Negative    SPECIMEN EXPIRATION DATE [id number]    T4 free    Collection Time: 03/15/22  8:05 AM   Result Value Ref Range    Free T4 1.06 0.76 - 1.46 ng/dL   General PFT Lab (Please always keep checked)    Collection Time: 03/15/22  8:35 AM   Result Value Ref Range    FVC-Pred 4.40 L    FVC-Pre 4.04 L    FVC-%Pred-Pre 91 %    FEV1-Pre 3.25 L    FEV1-%Pred-Pre 94 %    FEV1FVC-Pred 78 %    FEV1FVC-Pre 81 %    FEFMax-Pred 8.92 L/sec    FEFMax-Pre 6.85 L/sec    FEFMax-%Pred-Pre 76 %    FEF2575-Pred 2.96 L/sec    FEF2575-Pre 3.09 L/sec    HYS3409-%Pred-Pre 104 %    ExpTime-Pre 6.90 sec    FIFMax-Pre 4.17 L/sec    VC-Pred 4.69 L    VC-Pre 3.38 L    VC-%Pred-Pre 72 %    IC-Pred 3.75 L    IC-Pre 2.98 L    IC-%Pred-Pre 79 %    ERV-Pred 0.94 L    ERV-Pre 0.41 L    ERV-%Pred-Pre 43 %    FEV1FEV6-Pred 79 %    FEV1FEV6-Pre 81 %    DLCOunc-Pred 26.29 ml/min/mmHg    DLCOunc-Pre 22.53 ml/min/mmHg    DLCOunc-%Pred-Pre 85 %    VA-Pre 6.39 L    VA-%Pred-Pre 104 %    FEV1SVC-Pred 73 %    FEV1SVC-Pre 96 %     I had a long discussion with the patient regarding liver transplantation which included but was not limited to  the following points:    1. Liver transplant selection committee process.  2. The federal rules for cadaveric waiting list, the size and blood type matching of the organ. The availability of living-related donor transplantation.  3. The types of donors: brain death donors, non-heart beating donors, partial liver grafts: splits and living donor grafts  4. Extended criteria  Donors (older age, steasosis) and the increased  risk of primary non-function using the extended criteria donors  5. The CDC high risk donors,  Risk of donor transmitted infections and donor transmitted malignancy  6. The liver transplant operation and the associated risks and technical complications which can include intraoperative death, post operative death,  Primary non-function, bleeding requiring  re-operations, arterial and biliary complications, bowel perforations, and intra abdominal abscess. Some of these complicaitons may require a second operation.  7. The postoperative course, the ICU stay and risk of postoperative complications which can include sepsis, MI, stroke, brain injury, pneumonia, pleural effusions, and renal dysfunction.  8. The current 1 year and 5 year graft and patient survivals.  9. The need for life long immunosuppressive therapy and the side effects of these medications, including the possibility of toxicity, opportunistic infections, risk of cancer including lymphoma, and the possibility of rejection even if the patient is taking the medication exactly as prescribed.  10. The need for compliance with medications and follow-up visits in the clinic and thereafter.  11. The patient and family understand these risks and wish to proceed to transplantation     Review of prior external note(s) from - epic  60 minutes spent on the date of the encounter doing chart review, history and exam, documentation and further activities per the note    Again, thank you for allowing me to participate in the care of your patient.      Sincerely,    Nando Little MD

## 2022-03-16 NOTE — TELEPHONE ENCOUNTER
"Left VM with Tamika (mom) per agreement yesterday with referral to Cherie at [facility], [place] to call her ([phone]) and set up ALLYSSA Assessment for Trey - probably   the first week in April. Also indicated she should probably count on being part of the interview as collateral  for Trey and his use history and see to it that he signs an GALI for [facility] so we can enter the assessment   into his record and follow up with any recommendations/referrals they may have for him.     I also spoke to Cherie to give her a \"heads up\" to look for this appointment request and to verify they  take his insurance for the appointment.      Samuel Peña, LADC  "

## 2022-03-18 NOTE — TELEPHONE ENCOUNTER
Tamika (mom) left VM with me 3/17 saying Trey has ALLYSSA Assessment appointment scheduled with Ama at [facility] for 3/24/22. Tamika said she would see that Trey signs an GALI to send that Assessment to [facility] for entry into his chart and to allow for follow up with any recommendations they make for professional ALLYSSA (or other) services.     Samuel Peña, TWILA

## 2022-03-22 NOTE — LETTER
PHYSICIAN ORDERS    DATE & TIME ISSUED: 2022 7:44 AM  PATIENT NAME: Trey Oneill   : 1965     [facility] MR# : [id number]     DIAGNOSIS:  cirrhosis  ICD-10 CODE: K70.31   Orders  1 year after issue.  Please have done as soon as possible for liver transplant evaluation        Abdominal MRI with and without contrast (liver protocol)         Physical therapy - evaluation and treatment - increase strength     PLEASE FAX RESULTS AS SOON AS POSSIBLE TO [phone], ATTN:LabDE Thomas M. Leventhal, M.D.   of Medicine  Advanced & Transplant Hepatology  [facility]

## 2022-03-22 NOTE — COMMITTEE REVIEW
Abdominal Committee Review Note     Evaluation Date: 3/15/2022  Committee Review Date: 3/22/2022    Organ being evaluated for: Liver    Transplant Phase: Evaluation  Transplant Status: Active    Transplant Coordinator: Mehdi Choudhury Jr.  Transplant Surgeon:   Nando Little    Referring Physician: Miki Washington    Primary Diagnosis: Alcoholic Cirrhosis  Secondary Diagnosis:     Committee Review Members:  Anesthesiology Segundo Moeller, DO   Counselor Mental Health Samuel Peña, [facility]   Nutrition Che Nance, RD   Pharmacist Lexx Jesus, [facility]   Pharmacy Brittani Lemus    - Clinical Se Garcia, MSW, Dariana Callahan, [facility]   Transplant Lorie Escalante, RN, Kristi Tirado, RN, Marya West, RN, Jr Mehdi Choudhury, ROSE MARY, Tashia Mancilla MD, Mali Ayoub, NP, Mikki Burks, APRN CNP, Apryl Blood MD, Justin Gomez MD, Trey Huynh, RN   Transplant Hepatology  Gunner Tyson MD, Nikhil Izquierdo MD, Lea Patterson MD, Apryl Blood MD, Thomas M. Leventhal, MD   Transplant Surgery Aniyah Reynoso PA-C, Samira Agudelo MD, Marck García MD, Jey Sims MD       Transplant Eligibility: Cirrhosis with MELD, ETOH    Committee Review Decision: Needs Re-presentation    Relative Contraindications: Other, pending evaluation including CD work    Absolute Contraindications: None    Committee Chair Nikhil Izquierdo MD verbally attested to the committee's decision.    Committee Discussion Details:     Re-discuss pending    +++ needs MRI now +++    CD recs needed  PT eval/work referral

## 2022-04-08 NOTE — LETTER
PHYSICIAN ORDERS - one time order for 22                      **please keep other standing paracentesis order as is**  DATE & TIME ISSUED: 2022 4:34 PM  PATIENT NAME: Trey Oneill      : 1965     [facility] MR# : [id number]     DIAGNOSIS:  cirrhosis, ascites   ICD-10 CODE: K74.60, R18.8  1. Lidocaine 1% solution 10 ml - 10 mL, Injection  2. Albumin 25% only when 4 liters or more of ascites is removed, up to 50 grams  o 4-5 L removed give 12.5 grams  o 5-6 L removed give 25 grams  3. Draw platelet count if has not been checked within 1 month of paracentesis  4. Maximum  fluid volume to be removed: drain to dry  5. US paracentesis - 1 TIME IMAGING. Reason for exam: ascites   6. If there are any signs of infection and/or SBP please add the following to assess fluid:    Cell count with diff (with WBC)     WBC (if not with cell count)     Total protein (protein fluid)     Gram stain    Albumin fluid     Culture  Please call Daniel Choudhury RN at [phone] with questions or issues.       Thomas M. Leventhal, M.D.   of Medicine  Advanced & Transplant Hepatology  [facility]

## 2022-04-08 NOTE — LETTER
PHYSICIAN ORDERS - one time order for 22                      **please keep other standing paracentesis order as is**  DATE & TIME ISSUED: 2022 4:34 PM  PATIENT NAME: Trey Oneill      : 1965     [facility] MR# : [id number]     DIAGNOSIS:  cirrhosis, ascites   ICD-10 CODE: K74.60, R18.8  1. Lidocaine 1% solution 10 ml - 10 mL, Injection  2. Albumin 25% only when 4 liters or more of ascites is removed, up to 50 grams  o 4-5 L removed give 12.5 grams  o 5-6 L removed give 25 grams  3. Draw platelet count if has not been checked within 1 month of paracentesis  4. Maximum  fluid volume to be removed: drain to dry  5. US paracentesis - 1 TIME IMAGING. Reason for exam: ascites   6. If there are any signs of infection and/or SBP please add the following to assess fluid:    Cell count with diff (with WBC)     WBC (if not with cell count)     Total protein (protein fluid)     Gram stain    Albumin fluid     Culture  Please call Daniel Choudhury RN at [phone] with questions or issues.       Thomas M. Leventhal, M.D.   of Medicine  Advanced & Transplant Hepatology  [facility]

## 2022-04-08 NOTE — PROGRESS NOTES
[facility] MRI 3/31/22  1.) poor quality - can NOT characterize  2.) definitely has lesions that are concerning    Recs:  - needs repeat imaging, MRI would be best but CT would be ok if he can't do MRI  - + get paracentesis day before or of, and then get CT to make sure good quality

## 2022-04-18 NOTE — TELEPHONE ENCOUNTER
Patient's mother called saying patient is not feeling well and will not be able to make tomorrows appts.     Canceling CTA & MRI - will try to reschedule before 5/2 appts.

## 2022-04-18 NOTE — LETTER
PHYSICIAN ORDERS    DATE & TIME ISSUED: 2022 2:40 PM  PATIENT NAME: Trey Oneill   : 1965     [facility] MR# : [id number]     DIAGNOSIS:  cirrhosis, HTN, liver txp evaluation  ICD-10 CODE: 70.31, I10.0, Z01.818  Orders  1 year after issue.  Please have done on/by 22 in prep for cardiology consult at [facility]/3/[facility]  These labs must be drawn all together on the same day when done:             CTA angiogram coronary arteries     PLEASE FAX RESULTS AS SOON AS POSSIBLE TO [phone], ATTN:LabDE Thomas M. Leventhal, M.D.   of Medicine  Advanced & Transplant Hepatology  [facility]

## 2022-04-22 NOTE — TELEPHONE ENCOUNTER
Was admitted to [place], [place]  04/22/2022  mom  found Trey to be incoherent this afternnon  Mom Tamika reported   vitals 04/22/2022 Hgb., 7.2   Ammonia Level 110  B/P 84/44       last received Paracentesis 04/20/2022       [place] [facility]  [place]25/[place]22 was cancelled.

## 2022-04-25 NOTE — TELEPHONE ENCOUNTER
Just spoke with mom    Patient admitted to OSH and transitioning to hospice care.     Answered all questions and offered our condolences.     She thanked us for our help and willingness to give him a shot.

## 2022-04-25 NOTE — TELEPHONE ENCOUNTER
General    Reason for call: Tamika wanted to let Daniel know that patient will be moved to hospice due to condition continuing to decline. She is unsure if there's anything she should be doing on her end.     Tamika recommended to cancel 05/03 appointment    Call back needed? Yes  Return Call Needed  Same as documented in contacts section